# Patient Record
Sex: FEMALE | Race: BLACK OR AFRICAN AMERICAN | Employment: OTHER | URBAN - METROPOLITAN AREA
[De-identification: names, ages, dates, MRNs, and addresses within clinical notes are randomized per-mention and may not be internally consistent; named-entity substitution may affect disease eponyms.]

---

## 2018-06-08 ENCOUNTER — HOSPITAL ENCOUNTER (OUTPATIENT)
Dept: PHYSICAL THERAPY | Age: 63
Discharge: HOME | End: 2018-06-08
Payer: COMMERCIAL

## 2018-06-08 ENCOUNTER — APPOINTMENT (OUTPATIENT)
Dept: PHYSICAL THERAPY | Age: 63
End: 2018-06-08

## 2018-06-08 PROCEDURE — 97162 PT EVAL MOD COMPLEX 30 MIN: CPT

## 2018-06-08 PROCEDURE — G8979 MOBILITY GOAL STATUS: HCPCS

## 2018-06-08 PROCEDURE — G8978 MOBILITY CURRENT STATUS: HCPCS

## 2018-06-08 NOTE — PROGRESS NOTES
[address] PHYSICAL THERAPY   [facility], [facility] ,[place], [facility] - Phone: [phone]  Fax: [phone] OF CARE / [address] [facility]  Patient Name: Maria Isabel Looney : 1955   Medical   Diagnosis: Balance problem [R26.89] Treatment Diagnosis: Balance problem [R26.89]   Onset Date: Chronic      Referral Source: Josee Garza MD Start of Care [facility]): 2018   Prior Hospitalization: See medical history Provider #: [id number]   Prior Level of Function: Chronic falls, Achilles Injury Rupture , Amb with SPC (not present at eval), BL use of UE's with stairs   Comorbidities: Chronic Falls, Hx of Lupus, Fibromyalgia, HBP Latex Allergy, Asthma, Sjogrens Disease, Arthritis   Medications: Verified on Patient Summary List   The Plan of Care and following information is based on the information from the initial evaluation.   ==================================================================================  Assessment / key information:  Pt is 66year old female presenting with Chronic falls and balance issues for several years. Pt reports having previous PT which she reported helped decrease her falls and increase her LE strength, however pt denies continuation of exercises at home following discharge. Currently pt reports falling 3 times over the last month, and reports an average of 2-3 falls a month over the last 6 months. Pt reports falls occur both in her home and in the community. Pt denies any dizziness or vision deficits at this time. Pt reports she amb with quad cane at all time, however did not have it at the evaluation. Pt reports being a furniture walker at home and using her cane \"intermittently\" due to only having a \"1500 square foot house to move around in\". Pt reports history of BL achilles tendon rupture in  and having braces she wore to assist with gait, but currently does not wear them.  Pt denies any buckling in the BL knees while walking at this time. PMHx: Increased falls, arthritis, emphysema, COPD, Lupus, Sjougrens Disease. Current exam findings: pt amb with decreased ramone and widened TORIBIO. No AD used at the evaluation \"Its in the car\". ER and increased supination of the R foot as well as decreased heel strike and push off noted on the R. Decreased LE strength grossly 4-/5, with decreased R HS strength 3+/5. Decreased BL gastroc strength 3-/5. EO ROM stance 20s with sway, EO MSR 10 seconds with sway, unable to perform SLS. Stair negotiation one step at the time with BL UE support. Pt reports having 3 stairs to get into her house as well as 2-3 steps throughout the house. FGA score: 4/30 indication severe fall risk. Decreased sensation noted on the BL lateral lower legs and neuropathy reported in the BL feet. The patient was instructed in a home exercise program to address the above findings/deficits. Pt will benefit from PT interventions to address the aforementioned deficits and allow pt to return to PLOF.    ==================================================================================  Eval Complexity: History HIGH Complexity :3+ comorbidities / personal factors will impact the outcome/ POC ;  Examination  HIGH Complexity : 4+ Standardized tests and measures addressing body structure, function, activity limitation and / or participation in recreation ; Presentation MEDIUM Complexity : Evolving with changing characteristics ;   Decision Making MEDIUM Complexity : FOTO score of 26-74; Overall Complexity MEDIUM  Problem List: pain affecting function, decrease ROM, decrease strength, edema affecting function, impaired gait/ balance, decrease ADL/ functional abilitiies, decrease activity tolerance, decrease flexibility/ joint mobility and decrease transfer abilities   Treatment Plan may include any combination of the following: Therapeutic exercise, Therapeutic activities, Neuromuscular re-education, Physical agent/modality, Gait/balance training, Manual therapy, Patient education, Functional mobility training, Home safety training and Stair training  Patient / Family readiness to learn indicated by: asking questions, trying to perform skills and interest  Persons(s) to be included in education: patient (P)  Barriers to Learning/Limitations: None  Measures taken:    Patient Goal (s): \"Better balance to stop falling\"   Patient self reported health status: fair  Rehabilitation Potential: good   Short Term Goals: To be accomplished in  4  treatments:  1. Pt to demonstrate independence with HEP to improve functional mobility for ADLs. 2. Pt will be able to perform gait with head turns using SPC with minimal sway and deviation to improve FGA.  Long Term Goals: To be accomplished in  8  treatments:  Pt will improve FGA by 7 points in order to demonstrate improved functional ADL's and decrease fall risk. Pt to report +5 or > on GROC to improve functional mobility for ADLs. Patient will be independent with long term HEP in order to prepare for DC to home. Pt will be able to asc/desc a 5 flight of stairs with minimal UE assit in order to safely ambulate throughout the home. Frequency / Duration:   Patient to be seen  2  times per week for 8  treatments:  Patient / Caregiver education and instruction: exercises  G-Codes (GP): Mobility [id number] Current  CL= 60-79%   Goal  CJ= 20-39%. The severity rating is based on the FOTO Score    Therapist Signature: Rosa Braga PT, DPHELLEN   Date: 2/5/7663   Certification Period: 6/8/2018-9-7-18 Time: 11:50 AM   ==================================================================================  I certify that the above Physical Therapy Services are being furnished while the patient is under my care. I agree with the treatment plan and certify that this therapy is necessary.     Physician Signature:        Date:       Time:     Please sign and return to [facility] at [facility], [facility]. or you may fax the signed copy to [phone]. Thank you.

## 2018-06-08 NOTE — PROGRESS NOTES
PHYSICAL THERAPY - DAILY TREATMENT NOTE    Patient Name: Noemy Perera        Date: 2018  : 1955   yes Patient  Verified  Visit #:   1   of   8-10  Insurance: Payor: Bruce Fraction / Plan: [place] & [place] / Product Type: Medicare /      In time: 1200 Out time: 1250   Total Treatment Time: 50     Medicare Time Tracking (below)   Total Timed Codes (min):  50 1:1 Treatment Time:  50     TREATMENT AREA =  Balance problem [R26.89]    SUBJECTIVE  Pain Level (on 0 to 10 scale):  3  / 10   Medication Changes/New allergies or changes in medical history, any new surgeries or procedures?    no  If yes, update Summary List   Subjective Functional Status/Changes:  []  No changes reported     SEE POC         OBJECTIVE     min Patient Education:  yes  Established HEP   []  Progressed/Changed HEP based on: Other Objective/Functional Measures:    SEE POC     Post Treatment Pain Level (on 0 to 10) scale:   3  / 10     ASSESSMENT  Assessment/Changes in Function:     Evaluation Code Complexity: History HIGH Complexity :3+ comorbidities / personal factors will impact the outcome/ POC ; Examination HIGH Complexity : 4+ Standardized tests and measures addressing body structure, function, activity limitation and / or participation in recreation ; Presentation MEDIUM Complexity : Evolving with changing characteristics ; Decision Making MEDIUM Complexity : FOTO score of 26-74;  Complexity MEDIUM    Justification for Eval Code Complexity:  Patient History : Chronic falls, Achilles Injury Rupture , Amb with SPC (not present at eval), BL use of UE's with stairs  Examination SEE ABOVE EXAM  Clinical Presentation: evolving symptoms  Clinical Decision Making : FOTO 40         []  See Progress Note/Recertification   Patient will continue to benefit from skilled PT services to modify and progress therapeutic interventions, address functional mobility deficits, address ROM deficits, address strength deficits, analyze and address soft tissue restrictions, analyze and cue movement patterns, analyze and modify body mechanics/ergonomics, assess and modify postural abnormalities and address imbalance/dizziness to attain remaining goals. Progress toward goals / Updated goals:         PLAN  []  Upgrade activities as tolerated yes Continue plan of care   []  Discharge due to :    [x]  Other: Pt will be seen 1-2 times per week for 8-10 sessions.       Therapist: Marten Siemens, PT, DPT    Date: 6/8/2018 Time: 11:50 AM     Future Appointments  Date Time Provider Danelle Sapp   6/8/2018 12:00 PM Prabhu Schroeder, PT [facility]

## 2018-06-12 ENCOUNTER — HOSPITAL ENCOUNTER (OUTPATIENT)
Dept: PHYSICAL THERAPY | Age: 63
Discharge: HOME | End: 2018-06-12
Payer: COMMERCIAL

## 2018-06-12 PROCEDURE — 97110 THERAPEUTIC EXERCISES: CPT

## 2018-06-12 PROCEDURE — 97112 NEUROMUSCULAR REEDUCATION: CPT

## 2018-06-12 PROCEDURE — 97116 GAIT TRAINING THERAPY: CPT

## 2018-06-12 NOTE — PROGRESS NOTES
PHYSICAL THERAPY - DAILY TREATMENT NOTE    Patient Name: Rika Crouch        Date: 2018  : 1955   YES Patient  Verified  Visit #:   2   of   8-10  Insurance: Payor: Alsuzanne William / Plan: [facility] [employer] PPO / Product Type: PPO /      In time: 130 Out time: 220   Total Treatment Time: 50     Medicare Time Tracking (below)   Total Timed Codes (min):  50 1:1 Treatment Time:  50     TREATMENT AREA =  Balance problem [R26.89]    SUBJECTIVE  Pain Level (on 0 to 10 scale):  2  / 10   Medication Changes/New allergies or changes in medical history, any new surgeries or procedures? NO    If yes, update Summary List   Subjective Functional Status/Changes:  []  No changes reported     Pt reports a lot of falls over the last few years. OBJECTIVE  20 min Therapeutic Exercise:  [x]  See flow sheet   Rationale:      increase ROM, increase strength, improve coordination and improve balance to improve the patients ability to perform pain free ADLs. 20 Min Neuromuscular Re-ed: Balance activities per flow sheet. Rationale:    increase strength, improve coordination, improve balance and increase proprioception to improve the patients ability to ambulate. 10 min Gait Training: Gait activities per flow sheet. Rationale:    increase strength, improve coordination, improve balance and increase proprioception to improve the patients ability to ambulate. min Patient Education:  YES  Reviewed HEP   []  Progressed/Changed HEP based on: Other Objective/Functional Measures: Added multiple exercises to improve balance and stability for ADLs. See flow sheet. Post Treatment Pain Level (on 0 to 10) scale:   2  / 10     ASSESSMENT  Assessment/Changes in Function:     Demonstrating fair balance. Good tolerance to initial activities.        []  See Progress Note/Recertification   Patient will continue to benefit from skilled PT services to modify and progress therapeutic interventions, address functional mobility deficits, address ROM deficits, address strength deficits, analyze and address soft tissue restrictions, analyze and cue movement patterns, analyze and modify body mechanics/ergonomics and assess and modify postural abnormalities to attain remaining goals. Progress toward goals / Updated goals:    Initiated follow-up visits.        PLAN  [x]  Upgrade activities as tolerated YES Continue plan of care   []  Discharge due to :    []  Other:      Therapist: Edda Roach PTA    Date: 6/12/2018 Time: 2:43 PM     Future Appointments  Date Time Provider Danelle Sapp   6/13/2018 11:30 AM Neymar Schroeder, PT [facility]   6/18/2018 12:00 PM Edda Roach PTA [facility]   6/21/2018 12:00 PM Edda Roach PTA [facility]   6/25/2018 12:00 PM Edda Roach PTA [facility]   6/28/2018 12:00 PM Edda Roach PTA [facility]   7/2/2018 12:00 PM [address] [place] [address], PT [facility]   7/5/2018 12:00 PM Edda Roach PTA [facility]

## 2018-06-13 ENCOUNTER — HOSPITAL ENCOUNTER (OUTPATIENT)
Dept: PHYSICAL THERAPY | Age: 63
Discharge: HOME | End: 2018-06-13
Payer: COMMERCIAL

## 2018-06-13 PROCEDURE — 97116 GAIT TRAINING THERAPY: CPT

## 2018-06-13 PROCEDURE — 97112 NEUROMUSCULAR REEDUCATION: CPT

## 2018-06-13 PROCEDURE — 97110 THERAPEUTIC EXERCISES: CPT

## 2018-06-13 NOTE — PROGRESS NOTES
PHYSICAL THERAPY - DAILY TREATMENT NOTE    Patient Name: Frank Hines        Date: 2018  : 1955   YES Patient  Verified  Visit #:   3   of   8-10  Insurance: Payor: Rolando Sharma / [facility] [employer] PPO / Product Type: PPO /      In time: 1150 Out time: 0611   Total Treatment Time: 42     Medicare Time Tracking (below)   Total Timed Codes (min): 42 1:1 Treatment Time: 42     TREATMENT AREA =  Balance problem [R26.89]    SUBJECTIVE  Pain Level (on 0 to 10 scale):  10   Medication Changes/New allergies or changes in medical history, any new surgeries or procedures? NO    If yes, update Summary List   Subjective Functional Status/Changes:  []  No changes reported     \"My back has been hurting a lot today\"       OBJECTIVE  15 min Therapeutic Exercise:  [x]  See flow sheet   Rationale:      increase ROM, increase strength, improve coordination and improve balance to improve the patients ability to perform pain free ADLs. 23 Min Neuromuscular Re-ed: Balance activities per flow sheet. Rationale:    increase strength, improve coordination, improve balance and increase proprioception to improve the patients ability to ambulate. 8 min Gait Training: Assessment of ambulation with AFO's- see below    Rationale:    increase strength, improve coordination, improve balance and increase proprioception to improve the patients ability to ambulate. min Patient Education:  YES  Reviewed HEP   []  Progressed/Changed HEP based on: Other Objective/Functional Measures:    See therex per flow sheet  Pt denies any sharp pains or red flags following tx    Assessed fit of AFO's today. Assist required by PT to get AFO's on and in shoes. Increased swelling of the R ankle caused distal strap to be too tight. Both gastroc pieces appeared too lose at this time- pt reports they used to fit more securely but she was having leg swelling issues at the time as well.      Pt was educated on contacting MD about possible adjustments as well as possible new braces. Pt was also educated on wearing them for longer distance walking as patient reported and demonstrated better foot clearance with walking in the clinic. Pt was also educated to use her cane while using AFO's for increased stability while adjusting to walking with braces. Pt denied any pain while wearing the braces, however was also advised to perform skin checks to the feet and lower legs following use of braces due to decreased sensation. Pt verbalized understanding of all of the above education. Post Treatment Pain Level (on 0 to 10) scale:   2  / 10     ASSESSMENT  Assessment/Changes in Function:     Pt presented with increased pain in the back today, but denies any increase in pain following session yesterday. Was able to perform SLS for 10 seconds with BL 2 finger touch today. Moderate decrease in step up on the R with max use of BL UE's. Will continue to progress therex within current POC as patient is able. []  See Progress Note/Recertification   Patient will continue to benefit from skilled PT services to modify and progress therapeutic interventions, address functional mobility deficits, address ROM deficits, address strength deficits, analyze and address soft tissue restrictions, analyze and cue movement patterns, analyze and modify body mechanics/ergonomics and assess and modify postural abnormalities to attain remaining goals. Progress toward goals / Updated goals:    Slow progress towards goals. Pt was given HEP last visit (yesterday), will assess compliance over the next week.       PLAN  [x]  Upgrade activities as tolerated YES Continue plan of care   []  Discharge due to :    []  Other:      Therapist: Jacobo Bo PT    Date: 6/13/2018 Time: 1200PM     Future Appointments  Date Time Provider Danelle Sapp   6/13/2018 11:30 AM MERARY Cee [facility]   6/18/2018 12:00 PM Cordie Sandhoff April Arriaza [facility]   6/21/2018 12:00 PM Antonio Prom, PTA [facility]   6/25/2018 12:00 PM Antonio Prom, PTA [facility]   6/28/2018 12:00 PM Antonio Prom, PTA [facility]   7/2/2018 12:00 PM Sky Laird, PT [facility]   7/5/2018 12:00 PM Antonio Prom, PTA [facility]

## 2018-06-18 ENCOUNTER — APPOINTMENT (OUTPATIENT)
Dept: PHYSICAL THERAPY | Age: 63
End: 2018-06-18
Payer: COMMERCIAL

## 2018-06-21 ENCOUNTER — HOSPITAL ENCOUNTER (OUTPATIENT)
Dept: PHYSICAL THERAPY | Age: 63
Discharge: HOME | End: 2018-06-21
Payer: COMMERCIAL

## 2018-06-21 PROCEDURE — 97140 MANUAL THERAPY 1/> REGIONS: CPT

## 2018-06-21 PROCEDURE — 97110 THERAPEUTIC EXERCISES: CPT

## 2018-06-21 NOTE — PROGRESS NOTES
PHYSICAL THERAPY - DAILY TREATMENT NOTE    Patient Name: oCby Johnson        Date: 2018  : 1955   YES Patient  Verified  Visit #:     Insurance: Payor: Mayra Saxena / Plan: [facility] [employer] PPO / Product Type: PPO /      In time: 1205 Out time: 9440   Total Treatment Time: 30     Medicare Time Tracking (below)   Total Timed Codes (min):  30 1:1 Treatment Time:  30     TREATMENT AREA =  Balance problem [R26.89]    SUBJECTIVE  Pain Level (on 0 to 10 scale):  7  / 10   Medication Changes/New allergies or changes in medical history, any new surgeries or procedures? NO    If yes, update Summary List   Subjective Functional Status/Changes:  []  No changes reported     Pt reports improvement in balance. OBJECTIVE  15 min Therapeutic Exercise:  [x]  See flow sheet   Rationale:      increase ROM, increase strength, improve coordination and improve balance to improve the patients ability to ambulate. 15 Min Neuromuscular Re-ed: Balance/gait activities per flow sheet. Rationale:    increase strength, improve coordination, improve balance and increase proprioception to improve the patients ability to ambulate. min Patient Education:  YES  Reviewed HEP   []  Progressed/Changed HEP based on: Other Objective/Functional Measures: Therex per flow sheet. Post Treatment Pain Level (on 0 to 10) scale:   2  / 10     ASSESSMENT  Assessment/Changes in Function:     Improvement in balance ability - progressed to MSR GT w/o difficulty.       []  See Progress Note/Recertification   Patient will continue to benefit from skilled PT services to modify and progress therapeutic interventions, address functional mobility deficits, address ROM deficits, address strength deficits, analyze and address soft tissue restrictions, analyze and cue movement patterns, analyze and modify body mechanics/ergonomics, assess and modify postural abnormalities and address imbalance/dizziness to attain remaining goals. Progress toward goals / Updated goals:    Progressing toward balance goals with improvement in balance progressions.        PLAN  [x]  Upgrade activities as tolerated YES Continue plan of care   []  Discharge due to :    []  Other:      Therapist: Enio Frank PTA    Date: 6/21/2018 Time: 12:37 PM     Future Appointments  Date Time Provider Danelle Sapp   6/25/2018 12:00 PM Enio Frank PTA [facility]   6/28/2018 12:00 PM Enio Frank PTA [facility]   7/2/2018 12:00 PM Humberto Mccauley, PT [facility]   7/5/2018 12:00 PM Enio Frank, TRACEE [facility]

## 2018-06-25 ENCOUNTER — HOSPITAL ENCOUNTER (OUTPATIENT)
Dept: PHYSICAL THERAPY | Age: 63
Discharge: HOME | End: 2018-06-25
Payer: COMMERCIAL

## 2018-06-25 PROCEDURE — 97110 THERAPEUTIC EXERCISES: CPT

## 2018-06-25 PROCEDURE — 97112 NEUROMUSCULAR REEDUCATION: CPT

## 2018-06-28 ENCOUNTER — HOSPITAL ENCOUNTER (OUTPATIENT)
Dept: PHYSICAL THERAPY | Age: 63
Discharge: HOME | End: 2018-06-28
Payer: COMMERCIAL

## 2018-06-28 PROCEDURE — 97110 THERAPEUTIC EXERCISES: CPT

## 2018-06-28 PROCEDURE — 97112 NEUROMUSCULAR REEDUCATION: CPT

## 2018-06-28 NOTE — PROGRESS NOTES
PHYSICAL THERAPY - DAILY TREATMENT NOTE    Patient Name: Vicenta Chan        Date: 2018  : 1955   YES Patient  Verified  Visit #:     Insurance: Payor: Carolyn Rodriguez / Plan: [facility] [employer] PPO / Product Type: PPO /      In time: 1210 Out time: 1240   Total Treatment Time: 30     Medicare Time Tracking (below)   Total Timed Codes (min):  30 1:1 Treatment Time:  25     TREATMENT AREA =  Balance problem [R26.89]    SUBJECTIVE  Pain Level (on 0 to 10 scale):  0  / 10   Medication Changes/New allergies or changes in medical history, any new surgeries or procedures? NO    If yes, update Summary List   Subjective Functional Status/Changes:  []  No changes reported     States she feels really tired today and has a lot of clicking in the (R) knee. OBJECTIVE    15 (10) min Therapeutic Exercise:  [x]  See flow sheet   Rationale:      increase strength, improve coordination, improve balance and increase proprioception to improve the patients ability to perform pain free ADLs. 15 min Neuromuscular Re-ed: Balance and gait activities per flow sheet. Rationale:    increase strength, improve coordination, improve balance and increase proprioception to improve the patients ability to perform pain free ADLs. min Patient Education:  YES  Reviewed HEP   []  Progressed/Changed HEP based on: Other Objective/Functional Measures:    See flow sheet. Post Treatment Pain Level (on 0 to 10) scale:   2  / 10     ASSESSMENT  Assessment/Changes in Function:     Difficulty with sit <> stand. Unable to lock-out (R) knee.       []  See Progress Note/Recertification   Patient will continue to benefit from skilled PT services to modify and progress therapeutic interventions, address functional mobility deficits, address ROM deficits, address strength deficits, analyze and address soft tissue restrictions, analyze and cue movement patterns, analyze and modify body mechanics/ergonomics, assess and modify postural abnormalities and address imbalance/dizziness to attain remaining goals. Progress toward goals / Updated goals:    No change in progress toward LTG's with today's session.      PLAN  [x]  Upgrade activities as tolerated YES Continue plan of care   []  Discharge due to :    []  Other:      Therapist: Rita Amin PTA    Date: 6/28/2018 Time: 3:28 PM     Future Appointments  Date Time Provider Danelle Sapp   7/2/2018 12:00 PM Hawk Henderson PT [facility]   7/5/2018 12:00 PM Rita Amin PTA [facility]

## 2018-07-02 ENCOUNTER — HOSPITAL ENCOUNTER (OUTPATIENT)
Dept: PHYSICAL THERAPY | Age: 63
Discharge: HOME | End: 2018-07-02
Payer: COMMERCIAL

## 2018-07-02 PROCEDURE — 97110 THERAPEUTIC EXERCISES: CPT

## 2018-07-02 PROCEDURE — 97112 NEUROMUSCULAR REEDUCATION: CPT

## 2018-07-02 NOTE — PROGRESS NOTES
PHYSICAL THERAPY - DAILY TREATMENT NOTE    Patient Name: Patel Aguirre        Date: 2018  : 1955   YES Patient  Verified  Visit #:     Insurance: Payor: Carolina Galloway / Plan: [facility] [employer] PPO / Product Type: PPO /      In time:  Out time:    Total Treatment Time: 40     Medicare Time Tracking (below)   Total Timed Codes (min):  40 1:1 Treatment Time:  40     TREATMENT AREA =  Balance problem [R26.89]    SUBJECTIVE  Pain Level (on 0 to 10 scale):  9 / 10   Medication Changes/New allergies or changes in medical history, any new surgeries or procedures? NO    If yes, update Summary List   Subjective Functional Status/Changes:  []  No changes reported     Pt reports having increased back spasms all weekend due to having \"no help from my lazy son bringing in groceries over Friday\". Pt reported having spasms all weekend, as well as increased pain while longer periods of sitting and driving over the weekend. OBJECTIVE    10 min Therapeutic Exercise:  [x]  See flow sheet   Rationale:      increase strength, improve coordination, improve balance and increase proprioception to improve the patients ability to perform pain free ADLs. 30 min Neuromuscular Re-ed: Balance and gait activities per flow sheet. Rationale:    increase strength, improve coordination, improve balance and increase proprioception to improve the patients ability to perform pain free ADLs. min Patient Education:  YES  Reviewed HEP   []  Progressed/Changed HEP based on: Other Objective/Functional Measures:    See therex per flow sheet  Pt denies any sharp pains or red flags following tx    Modified therex today due to noted increased fatigue and pain reported 9/10 located in the back. Pain described as sharp and stabbing in the center of the back with all standing therex.        Post Treatment Pain Level (on 0 to 10) scale:  7 / 10     ASSESSMENT  Assessment/Changes in Function:     Continues to have moderate limitations in therapy progression due to increased fatigue and increased pain levels in the back. Mild improvements in standing balance with EC noted today and EC on the foam. Continues to amb with SPC in the clinic and noted decreased foot clearance BL. No AFO's worn today to therapy. Will continue to progress therex within current POC as patient is able. []  See Progress Note/Recertification   Patient will continue to benefit from skilled PT services to modify and progress therapeutic interventions, address functional mobility deficits, address ROM deficits, address strength deficits, analyze and address soft tissue restrictions, analyze and cue movement patterns, analyze and modify body mechanics/ergonomics, assess and modify postural abnormalities and address imbalance/dizziness to attain remaining goals. Progress toward goals / Updated goals:    No progress towards goals today.      PLAN  [x]  Upgrade activities as tolerated YES Continue plan of care   []  Discharge due to :    []  Other:      Therapist: Rubina Betancourt PT    Date: 7/2/2018 Time: 1200PM     Future Appointments  Date Time Provider Danelle Sapp   7/2/2018 12:00 PM Rubina Betancourt PT [facility]   7/5/2018 12:00 PM Sin Soto PTA [facility]

## 2018-07-06 ENCOUNTER — HOSPITAL ENCOUNTER (OUTPATIENT)
Dept: PHYSICAL THERAPY | Age: 63
Discharge: HOME | End: 2018-07-06
Payer: COMMERCIAL

## 2018-07-06 PROCEDURE — 97112 NEUROMUSCULAR REEDUCATION: CPT

## 2018-07-06 PROCEDURE — G8978 MOBILITY CURRENT STATUS: HCPCS

## 2018-07-06 PROCEDURE — 97110 THERAPEUTIC EXERCISES: CPT

## 2018-07-06 PROCEDURE — G8979 MOBILITY GOAL STATUS: HCPCS

## 2018-07-06 NOTE — PROGRESS NOTES
PHYSICAL THERAPY - DAILY TREATMENT NOTE    Patient Name: Ian Lawler        Date: 2018  : 1955   YES Patient  Verified  Visit #:     Insurance: Payor: Jun Reyes / Plan: [facility] [employer] PPO / Product Type: PPO /      In time: 1035 Out time: 1115   Total Treatment Time: 40     Medicare Time Tracking (below)   Total Timed Codes (min):  40 1:1 Treatment Time:  40     TREATMENT AREA =  Balance problem [R26.89]    SUBJECTIVE  Pain Level (on 0 to 10 scale): 0 / 10   Medication Changes/New allergies or changes in medical history, any new surgeries or procedures? NO    If yes, update Summary List   Subjective Functional Status/Changes:  []  No changes reported     SEE PN         OBJECTIVE    10 min Therapeutic Exercise:  [x]  See flow sheet   Rationale:      increase strength, improve coordination, improve balance and increase proprioception to improve the patients ability to perform pain free ADLs. 30 min Neuromuscular Re-ed: Balance and gait activities per flow sheet. Rationale:    increase strength, improve coordination, improve balance and increase proprioception to improve the patients ability to perform pain free ADLs. min Patient Education:  YES  Reviewed HEP   []  Progressed/Changed HEP based on: Other Objective/Functional Measures:    SEE PN. Post Treatment Pain Level (on 0 to 10) scale:  0 / 10     ASSESSMENT  Assessment/Changes in Function:     SEE PN       []  See Progress Note/Recertification   Patient will continue to benefit from skilled PT services to modify and progress therapeutic interventions, address functional mobility deficits, address ROM deficits, address strength deficits, analyze and address soft tissue restrictions, analyze and cue movement patterns, analyze and modify body mechanics/ergonomics, assess and modify postural abnormalities and address imbalance/dizziness to attain remaining goals.    Progress toward goals / Updated goals:    SEE PN     PLAN  [x]  Upgrade activities as tolerated YES Continue plan of care   []  Discharge due to :    []  Other:      Therapist: Willean Shone, PT    Date: 7/6/2018 Time: 1200PM     Future Appointments  Date Time Provider Danelle Sapp   7/6/2018 10:30 AM Darion Schroeder, PT [facility]

## 2018-07-11 ENCOUNTER — APPOINTMENT (OUTPATIENT)
Dept: PHYSICAL THERAPY | Age: 63
End: 2018-07-11
Payer: COMMERCIAL

## 2018-07-18 ENCOUNTER — APPOINTMENT (OUTPATIENT)
Dept: PHYSICAL THERAPY | Age: 63
End: 2018-07-18
Payer: COMMERCIAL

## 2018-08-17 NOTE — PROGRESS NOTES
Judy Murillo [address] [facility]  [address] [place] [address], [address],[facility], [facility] - Phone: [phone]  Fax: [phone]  DISCHARGE SUMMARY      Patient Name: Max Acevedo : 1955   Treatment/Medical Diagnosis: Balance problem [R26.89]   Onset Date: Chronic    Referral Source: Rashid Cole MD Start of [facility]): 18   Prior Hospitalization: See Medical History Provider #: [id number]   Prior Level of Function: Chronic falls, Achilles Injury Rupture , Amb with SPC (not present at eval), BL use of UE's with stairs   Comorbidities: Chronic Falls, Hx of Lupus, Fibromyalgia, HBP Latex Allergy, Asthma, Sjogrens Disease, Arthritis   Medications: Verified on Patient Summary List   Visits from [facility]: 8 Missed Visits: CX: 1  NS: 1     Short Term Goals: To be accomplished in  4  treatments:  Pt to demonstrate independence with HEP to improve functional mobility for ADLs- goal progressing  Pt will be able to perform gait with head turns using SPC with minimal sway and deviation to improve FGA. - goal progressing, improved but not met        . Long Term Goals: To be accomplished in  8  treatments:  Pt will improve FGA by 7 points in order to demonstrate improved functional ADL's and decrease fall risk.- goal met  Pt to report +5 or > on GROC to improve functional mobility for ADLs- goal progressing +4  Patient will be independent with long term HEP in order to prepare for DC to home- will assess at DC  Pt will be able to asc/desc a 5 flight of stairs with minimal UE assit in order to safely ambulate throughout the home- goal not met, but improved      Key Functional Changes/Progress: Pt was seen for 8 visits since her IE on 18. Pt reported 75% overall improvement with her walking and balance since beginning PT.  PT has been focused on static and dynamic standing balance with various foot positions and EO/EC, gait training with head turns vertical and horizontal, stair negotations, as well as LE strengthening. Pt was able to progress fair in therapy and continues to be limited with standing and walking tolerance. Pt was able to perform short distance amb without SPC but only with supervision. Pt has been educated on the importance of use of SPC for safety and to prevent falls, however continued to present without the cane over several visits. Pt  improved FGA to 11/30, compared to 4/30 at IE. Pt did not return to therapy following her last session on 7/6/18 and has not been seen in the last 30 days. Pt will be DC from therapy at this time. G-Codes (GP): Mobility  [id number] Goal  CJ= 20-39%  D/C  CL= 60-79%. The severity rating is based on the FOTO Score    Recommendations: Pt will be DC rfom therapy at this time due to not returning over the last 30 days. If you have any questions/comments please contact us directly at [phone]. Thank you for allowing us to assist in the care of your patient.     Therapist Signature: Daniela Machado PT, DPT Date: 2/23/0233   Certification Period:  Reporting Period: 6/9/18-9/7/18 6/8/18-7/6/18 Time: 7:39 AM   NOTE TO PHYSICIAN:  PLEASE COMPLETE THE ORDERS BELOW AND FAX TO   [facility] Physical Therapy: [phone]  If you are unable to process this request in 24 hours please contact our office: [phone]    Physician Signature:        Date:       Time: